# Patient Record
Sex: MALE | Race: WHITE | Employment: OTHER | URBAN - METROPOLITAN AREA
[De-identification: names, ages, dates, MRNs, and addresses within clinical notes are randomized per-mention and may not be internally consistent; named-entity substitution may affect disease eponyms.]

---

## 2017-04-05 ENCOUNTER — HOSPITAL ENCOUNTER (OUTPATIENT)
Dept: OTHER | Age: 77
Discharge: HOME | End: 2017-04-30
Attending: PSYCHIATRY & NEUROLOGY | Admitting: PSYCHIATRY & NEUROLOGY

## 2017-04-05 ASSESSMENT — PAIN SCALES - GENERAL: PAINLEVEL_OUTOF10: 3

## 2017-04-05 ASSESSMENT — PAIN DESCRIPTION - LOCATION: LOCATION: COCCYX

## 2017-04-17 ENCOUNTER — HOSPITAL ENCOUNTER (OUTPATIENT)
Dept: PHYSICAL THERAPY | Age: 77
Discharge: HOME | End: 2017-04-18
Admitting: PSYCHIATRY & NEUROLOGY

## 2017-04-19 ENCOUNTER — HOSPITAL ENCOUNTER (OUTPATIENT)
Dept: PHYSICAL THERAPY | Age: 77
Discharge: HOME | End: 2017-04-20
Admitting: PSYCHIATRY & NEUROLOGY

## 2017-04-24 ENCOUNTER — HOSPITAL ENCOUNTER (OUTPATIENT)
Dept: PHYSICAL THERAPY | Age: 77
Discharge: HOME | End: 2017-04-25
Admitting: PSYCHIATRY & NEUROLOGY

## 2017-04-26 ENCOUNTER — HOSPITAL ENCOUNTER (OUTPATIENT)
Dept: PHYSICAL THERAPY | Age: 77
Discharge: HOME | End: 2017-04-27
Admitting: PSYCHIATRY & NEUROLOGY

## 2017-05-03 ENCOUNTER — HOSPITAL ENCOUNTER (OUTPATIENT)
Dept: PHYSICAL THERAPY | Age: 77
Discharge: HOME | End: 2017-05-04
Admitting: PSYCHIATRY & NEUROLOGY

## 2017-05-10 ENCOUNTER — HOSPITAL ENCOUNTER (OUTPATIENT)
Dept: PHYSICAL THERAPY | Age: 77
Discharge: HOME | End: 2017-05-11
Admitting: PSYCHIATRY & NEUROLOGY

## 2017-05-12 ENCOUNTER — HOSPITAL ENCOUNTER (OUTPATIENT)
Dept: PHYSICAL THERAPY | Age: 77
Discharge: HOME | End: 2017-05-13
Admitting: PSYCHIATRY & NEUROLOGY

## 2017-05-16 ENCOUNTER — HOSPITAL ENCOUNTER (OUTPATIENT)
Dept: PHYSICAL THERAPY | Age: 77
Discharge: HOME | End: 2017-05-17
Admitting: PSYCHIATRY & NEUROLOGY

## 2018-10-09 ENCOUNTER — APPOINTMENT (OUTPATIENT)
Dept: GENERAL RADIOLOGY | Age: 78
End: 2018-10-09
Payer: MEDICARE

## 2018-10-09 ENCOUNTER — APPOINTMENT (OUTPATIENT)
Dept: CT IMAGING | Age: 78
End: 2018-10-09
Payer: MEDICARE

## 2018-10-09 ENCOUNTER — HOSPITAL ENCOUNTER (EMERGENCY)
Age: 78
Discharge: HOME | End: 2018-10-09
Attending: EMERGENCY MEDICINE
Payer: MEDICARE

## 2018-10-09 VITALS
HEART RATE: 54 BPM | OXYGEN SATURATION: 100 % | RESPIRATION RATE: 17 BRPM | SYSTOLIC BLOOD PRESSURE: 157 MMHG | DIASTOLIC BLOOD PRESSURE: 90 MMHG | TEMPERATURE: 97.6 F

## 2018-10-09 DIAGNOSIS — R41.82 ALTERED MENTAL STATUS, UNSPECIFIED ALTERED MENTAL STATUS TYPE: Primary | ICD-10-CM

## 2018-10-09 DIAGNOSIS — G31.83 LEWY BODY PARKINSON'S DISEASE (HCC): ICD-10-CM

## 2018-10-09 DIAGNOSIS — F02.80 LEWY BODY PARKINSON'S DISEASE (HCC): ICD-10-CM

## 2018-10-09 LAB
A/G RATIO: 1.6 (ref 1.1–2.2)
ALBUMIN SERPL-MCNC: 4.4 G/DL (ref 3.4–5)
ALP BLD-CCNC: 64 U/L (ref 40–129)
ALT SERPL-CCNC: <5 U/L (ref 10–40)
ANION GAP SERPL CALCULATED.3IONS-SCNC: 13 MMOL/L (ref 3–16)
AST SERPL-CCNC: 12 U/L (ref 15–37)
BASOPHILS ABSOLUTE: 0 K/UL (ref 0–0.2)
BASOPHILS RELATIVE PERCENT: 0.7 %
BILIRUB SERPL-MCNC: 0.8 MG/DL (ref 0–1)
BILIRUBIN URINE: NEGATIVE
BLOOD, URINE: ABNORMAL
BUN BLDV-MCNC: 27 MG/DL (ref 7–20)
CALCIUM SERPL-MCNC: 9.7 MG/DL (ref 8.3–10.6)
CHLORIDE BLD-SCNC: 103 MMOL/L (ref 99–110)
CLARITY: CLEAR
CO2: 26 MMOL/L (ref 21–32)
COLOR: YELLOW
COMMENT UA: ABNORMAL
CREAT SERPL-MCNC: 1 MG/DL (ref 0.8–1.3)
EOSINOPHILS ABSOLUTE: 0.1 K/UL (ref 0–0.6)
EOSINOPHILS RELATIVE PERCENT: 1.6 %
EPITHELIAL CELLS, UA: ABNORMAL /HPF
GFR AFRICAN AMERICAN: >60
GFR NON-AFRICAN AMERICAN: >60
GLOBULIN: 2.7 G/DL
GLUCOSE BLD-MCNC: 104 MG/DL (ref 70–99)
GLUCOSE URINE: NEGATIVE MG/DL
HCT VFR BLD CALC: 37.6 % (ref 40.5–52.5)
HEMOGLOBIN: 12.6 G/DL (ref 13.5–17.5)
KETONES, URINE: ABNORMAL MG/DL
LEUKOCYTE ESTERASE, URINE: NEGATIVE
LYMPHOCYTES ABSOLUTE: 1 K/UL (ref 1–5.1)
LYMPHOCYTES RELATIVE PERCENT: 15.5 %
MCH RBC QN AUTO: 31.8 PG (ref 26–34)
MCHC RBC AUTO-ENTMCNC: 33.5 G/DL (ref 31–36)
MCV RBC AUTO: 94.9 FL (ref 80–100)
MICROSCOPIC EXAMINATION: YES
MONOCYTES ABSOLUTE: 0.7 K/UL (ref 0–1.3)
MONOCYTES RELATIVE PERCENT: 10.6 %
MUCUS: ABNORMAL /LPF
NEUTROPHILS ABSOLUTE: 4.5 K/UL (ref 1.7–7.7)
NEUTROPHILS RELATIVE PERCENT: 71.6 %
NITRITE, URINE: NEGATIVE
PDW BLD-RTO: 14.2 % (ref 12.4–15.4)
PH UA: 5.5
PLATELET # BLD: 143 K/UL (ref 135–450)
PMV BLD AUTO: 9.9 FL (ref 5–10.5)
POTASSIUM REFLEX MAGNESIUM: 4.2 MMOL/L (ref 3.5–5.1)
PROTEIN UA: 30 MG/DL
RBC # BLD: 3.96 M/UL (ref 4.2–5.9)
RBC UA: ABNORMAL /HPF (ref 0–2)
SODIUM BLD-SCNC: 142 MMOL/L (ref 136–145)
SPECIFIC GRAVITY UA: >=1.03
TOTAL PROTEIN: 7.1 G/DL (ref 6.4–8.2)
TRICHOMONAS: ABNORMAL /HPF
TROPONIN: <0.01 NG/ML
URINE REFLEX TO CULTURE: ABNORMAL
URINE TYPE: ABNORMAL
UROBILINOGEN, URINE: 1 E.U./DL
WBC # BLD: 6.4 K/UL (ref 4–11)
WBC UA: ABNORMAL /HPF (ref 0–5)

## 2018-10-09 PROCEDURE — 80053 COMPREHEN METABOLIC PANEL: CPT

## 2018-10-09 PROCEDURE — 85025 COMPLETE CBC W/AUTO DIFF WBC: CPT

## 2018-10-09 PROCEDURE — 99285 EMERGENCY DEPT VISIT HI MDM: CPT

## 2018-10-09 PROCEDURE — 71045 X-RAY EXAM CHEST 1 VIEW: CPT

## 2018-10-09 PROCEDURE — 70450 CT HEAD/BRAIN W/O DYE: CPT

## 2018-10-09 PROCEDURE — 84484 ASSAY OF TROPONIN QUANT: CPT

## 2018-10-09 PROCEDURE — 93005 ELECTROCARDIOGRAM TRACING: CPT | Performed by: EMERGENCY MEDICINE

## 2018-10-09 PROCEDURE — 2580000003 HC RX 258: Performed by: EMERGENCY MEDICINE

## 2018-10-09 PROCEDURE — 81001 URINALYSIS AUTO W/SCOPE: CPT

## 2018-10-09 RX ORDER — 0.9 % SODIUM CHLORIDE 0.9 %
500 INTRAVENOUS SOLUTION INTRAVENOUS ONCE
Status: COMPLETED | OUTPATIENT
Start: 2018-10-09 | End: 2018-10-09

## 2018-10-09 RX ADMIN — SODIUM CHLORIDE 500 ML: 9 INJECTION, SOLUTION INTRAVENOUS at 12:34

## 2018-10-09 ASSESSMENT — ENCOUNTER SYMPTOMS
EYE PAIN: 0
ABDOMINAL PAIN: 0
COUGH: 0
VOMITING: 0
BACK PAIN: 0
EYE REDNESS: 0
DIARRHEA: 0
EYE DISCHARGE: 0
WHEEZING: 0
SORE THROAT: 0
NAUSEA: 0
SHORTNESS OF BREATH: 0
RHINORRHEA: 0

## 2018-10-09 NOTE — ED PROVIDER NOTES
[facility]  eMERGENCY dEPARTMENT eNCOUnter        Pt Name: Erick Chen  MRN: [id number]  [place] 1940  Date of evaluation: 10/9/2018  Provider: Catina Decker MD  PCP: Kris Waters       Chief Complaint   Patient presents with    Altered Mental Status     x 2 weeks, pt has dementia       HISTORY OF PRESENT ILLNESS   (Location/Symptom, Timing/Onset, Context/Setting, Quality, Duration, Modifying Factors, Severity)  Note limiting factors. Ercik Chen is a [de-identified] y.o. male       Location/Symptom: Change in mental status  Timing/Onset: Past 3-4 days  Context/Settin-year-old gentleman still lives at home with his wife. He does have a history of Parkinson's disease as well as Lewy body dementia. Family members state that the last 3 or 4 days he's been not himself not drinking much and has a decreasing urine output. Quality: No appreciable fever or nausea or vomiting or trouble breathing. Duration: Past 4 days  Modifying Factors: None  Severity: 0    Nursing Notes were all reviewed and agreed with or any disagreements were addressed  in the HPI. REVIEW OF SYSTEMS    (2-9 systems for level 4, 10 or more for level 5)     Review of Systems   Constitutional: Positive for activity change and appetite change. Negative for chills, fatigue and fever. HENT: Negative for ear pain, rhinorrhea and sore throat. Eyes: Negative for pain, discharge, redness and visual disturbance. Respiratory: Negative for cough, shortness of breath and wheezing. Cardiovascular: Negative for chest pain, palpitations and leg swelling. Gastrointestinal: Negative for abdominal pain, diarrhea, nausea and vomiting. Genitourinary: Negative for difficulty urinating and dysuria. Urinary incontinence   Musculoskeletal: Negative for arthralgias, back pain and myalgias. Skin: Negative for rash.    Allergic/Immunologic: Negative for environmental allergies. Neurological: Negative for dizziness, seizures, syncope and headaches. Parkinson's and dementia   Hematological: Negative for adenopathy. Psychiatric/Behavioral: Positive for confusion. Negative for suicidal ideas. The patient is not nervous/anxious. PAST MEDICAL HISTORY     Past Medical History:   Diagnosis Date    Dementia     Diverticulitis     Parkinson disease ([address]          SURGICAL HISTORY       Past Surgical History:   Procedure Laterality Date    APPENDECTOMY      CHOLECYSTECTOMY      EYE SURGERY      bilateral cataract surgery     HERNIA REPAIR           CURRENT MEDICATIONS       Previous Medications    No medications on file       ALLERGIES     Patient has no known allergies. FAMILY HISTORY     History reviewed. No pertinent family history. SOCIAL HISTORY       Social History     Social History    Marital status:      Spouse name: N/A    Number of children: N/A    Years of education: N/A     Social History Main Topics    Smoking status: Former Smoker    Smokeless tobacco: Never Used    Alcohol use No    Drug use: No    Sexual activity: Not Asked     Other Topics Concern    None     Social History Narrative    None       SCREENINGS    [place] Coma Scale  Eye Opening: Spontaneous  Best Verbal Response: Oriented  Best Motor Response: Obeys commands  [place] Coma Scale Score: 15        PHYSICAL EXAM    (up to 7 for level 4, 8 or more for level 5)     ED Triage Vitals [10/09/18 1114]   BP Temp Temp Source Pulse Resp SpO2 Height Weight   (!) 114/58 97.6 °F (36.4 °C) Temporal 64 14 97 % -- --      temporal temperature is 97.6 °F (36.4 °C). His blood pressure is 114/58 (abnormal) and his pulse is 64. His respiration is 14 and oxygen saturation is 97%. Physical Exam   Constitutional: He appears well-developed and well-nourished. He appears cachectic. HENT:   Head: Normocephalic and atraumatic.    Right Ear: External ear normal.   Left Ear: Metabolic Panel w/ Reflex to MG   Result Value Ref Range    Sodium 142 136 - 145 mmol/L    Potassium reflex Magnesium 4.2 3.5 - 5.1 mmol/L    Chloride 103 99 - 110 mmol/L    CO2 26 21 - 32 mmol/L    Anion Gap 13 3 - 16    Glucose 104 (H) 70 - 99 mg/dL    BUN 27 (H) 7 - 20 mg/dL    CREATININE 1.0 0.8 - 1.3 mg/dL    GFR Non-African American >60 >60    GFR African American >60 >60    Calcium 9.7 8.3 - 10.6 mg/dL    Total Protein 7.1 6.4 - 8.2 g/dL    Alb 4.4 3.4 - 5.0 g/dL    Albumin/Globulin Ratio 1.6 1.1 - 2.2    Total Bilirubin 0.8 0.0 - 1.0 mg/dL    Alkaline Phosphatase 64 40 - 129 U/L    ALT <5 (L) 10 - 40 U/L    AST 12 (L) 15 - 37 U/L    Globulin 2.7 g/dL   Troponin   Result Value Ref Range    Troponin <0.01 <0.01 ng/mL   Urine Reflex to Culture   Result Value Ref Range    Color, UA Yellow Straw/Yellow    Clarity, UA Clear Clear    Glucose, Ur Negative Negative mg/dL    Bilirubin Urine Negative Negative    Ketones, Urine TRACE (A) Negative mg/dL    Specific Gravity, UA >=1.030 1.005 - 1.030    Blood, Urine TRACE-INTACT (A) Negative    pH, UA 5.5 5.0 - 8.0    Protein, UA 30 (A) Negative mg/dL    Urobilinogen, Urine 1.0 <2.0 E.U./dL    Nitrite, Urine Negative Negative    Leukocyte Esterase, Urine Negative Negative    Microscopic Examination YES     Urine Reflex to Culture Not Indicated     Urine Type Not Specified    Microscopic Urinalysis   Result Value Ref Range    Mucus, UA Rare (A) /LPF    WBC, UA None seen 0 - 5 /HPF    RBC, UA 0-2 0 - 2 /HPF    Epi Cells 3-5 /HPF    Trichomonas, UA None Seen /HPF    Urinalysis Comments see below        All other labs were within normal range or not returned as of this dictation. EKG: All EKG's are interpreted by the Emergency Department Physician who either signs or Co-signs this chart in the absence of a cardiologist.    EKG visualized interpreted by myself. The rhythm is sinus. There is left ventricular hypertrophy and repolarization changes.   Evidence of previous

## 2018-10-10 LAB
EKG ATRIAL RATE: 71 BPM
EKG DIAGNOSIS: NORMAL
EKG P AXIS: 97 DEGREES
EKG P-R INTERVAL: 182 MS
EKG Q-T INTERVAL: 420 MS
EKG QRS DURATION: 120 MS
EKG QTC CALCULATION (BAZETT): 456 MS
EKG R AXIS: -51 DEGREES
EKG T AXIS: 27 DEGREES
EKG VENTRICULAR RATE: 71 BPM

## 2018-10-10 PROCEDURE — 93010 ELECTROCARDIOGRAM REPORT: CPT | Performed by: INTERNAL MEDICINE

## 2019-06-09 ENCOUNTER — APPOINTMENT (OUTPATIENT)
Dept: GENERAL RADIOLOGY | Age: 79
End: 2019-06-09
Payer: COMMERCIAL

## 2019-06-09 ENCOUNTER — APPOINTMENT (OUTPATIENT)
Dept: CT IMAGING | Age: 79
End: 2019-06-09
Payer: COMMERCIAL

## 2019-06-09 ENCOUNTER — HOSPITAL ENCOUNTER (EMERGENCY)
Age: 79
Discharge: HOME | End: 2019-06-09
Attending: EMERGENCY MEDICINE
Payer: COMMERCIAL

## 2019-06-09 VITALS
RESPIRATION RATE: 21 BRPM | SYSTOLIC BLOOD PRESSURE: 200 MMHG | OXYGEN SATURATION: 99 % | HEART RATE: 99 BPM | DIASTOLIC BLOOD PRESSURE: 95 MMHG | TEMPERATURE: 99 F

## 2019-06-09 DIAGNOSIS — S06.341A: Primary | ICD-10-CM

## 2019-06-09 DIAGNOSIS — S00.83XA FACIAL CONTUSION, INITIAL ENCOUNTER: ICD-10-CM

## 2019-06-09 LAB
A/G RATIO: 1.6 (ref 1.1–2.2)
ALBUMIN SERPL-MCNC: 4.5 G/DL (ref 3.4–5)
ALP BLD-CCNC: 79 U/L (ref 40–129)
ALT SERPL-CCNC: <5 U/L (ref 10–40)
ANION GAP SERPL CALCULATED.3IONS-SCNC: 8 MMOL/L (ref 3–16)
AST SERPL-CCNC: 15 U/L (ref 15–37)
BASOPHILS ABSOLUTE: 0.1 K/UL (ref 0–0.2)
BASOPHILS RELATIVE PERCENT: 1.2 %
BILIRUB SERPL-MCNC: 0.7 MG/DL (ref 0–1)
BUN BLDV-MCNC: 24 MG/DL (ref 7–20)
CALCIUM SERPL-MCNC: 9.4 MG/DL (ref 8.3–10.6)
CHLORIDE BLD-SCNC: 107 MMOL/L (ref 99–110)
CO2: 27 MMOL/L (ref 21–32)
CREAT SERPL-MCNC: 0.8 MG/DL (ref 0.8–1.3)
EOSINOPHILS ABSOLUTE: 0.1 K/UL (ref 0–0.6)
EOSINOPHILS RELATIVE PERCENT: 2.9 %
GFR AFRICAN AMERICAN: >60
GFR NON-AFRICAN AMERICAN: >60
GLOBULIN: 2.8 G/DL
GLUCOSE BLD-MCNC: 134 MG/DL (ref 70–99)
GLUCOSE BLD-MCNC: 154 MG/DL (ref 70–99)
HCT VFR BLD CALC: 37.4 % (ref 40.5–52.5)
HEMOGLOBIN: 12.3 G/DL (ref 13.5–17.5)
INR BLD: 1.07 (ref 0.86–1.14)
LYMPHOCYTES ABSOLUTE: 1 K/UL (ref 1–5.1)
LYMPHOCYTES RELATIVE PERCENT: 21.8 %
MCH RBC QN AUTO: 30.8 PG (ref 26–34)
MCHC RBC AUTO-ENTMCNC: 32.9 G/DL (ref 31–36)
MCV RBC AUTO: 93.5 FL (ref 80–100)
MONOCYTES ABSOLUTE: 0.4 K/UL (ref 0–1.3)
MONOCYTES RELATIVE PERCENT: 8.5 %
NEUTROPHILS ABSOLUTE: 3.1 K/UL (ref 1.7–7.7)
NEUTROPHILS RELATIVE PERCENT: 65.6 %
PDW BLD-RTO: 14 % (ref 12.4–15.4)
PERFORMED ON: ABNORMAL
PLATELET # BLD: 186 K/UL (ref 135–450)
PMV BLD AUTO: 9.7 FL (ref 5–10.5)
POTASSIUM SERPL-SCNC: 4.3 MMOL/L (ref 3.5–5.1)
PROTHROMBIN TIME: 12.2 SEC (ref 9.8–13)
RBC # BLD: 4 M/UL (ref 4.2–5.9)
SODIUM BLD-SCNC: 142 MMOL/L (ref 136–145)
TOTAL PROTEIN: 7.3 G/DL (ref 6.4–8.2)
WBC # BLD: 4.7 K/UL (ref 4–11)

## 2019-06-09 PROCEDURE — 36415 COLL VENOUS BLD VENIPUNCTURE: CPT

## 2019-06-09 PROCEDURE — 70450 CT HEAD/BRAIN W/O DYE: CPT

## 2019-06-09 PROCEDURE — 70496 CT ANGIOGRAPHY HEAD: CPT

## 2019-06-09 PROCEDURE — 71045 X-RAY EXAM CHEST 1 VIEW: CPT

## 2019-06-09 PROCEDURE — 70498 CT ANGIOGRAPHY NECK: CPT

## 2019-06-09 PROCEDURE — 6360000002 HC RX W HCPCS: Performed by: EMERGENCY MEDICINE

## 2019-06-09 PROCEDURE — 85610 PROTHROMBIN TIME: CPT

## 2019-06-09 PROCEDURE — 80053 COMPREHEN METABOLIC PANEL: CPT

## 2019-06-09 PROCEDURE — 85025 COMPLETE CBC W/AUTO DIFF WBC: CPT

## 2019-06-09 PROCEDURE — 6360000004 HC RX CONTRAST MEDICATION: Performed by: EMERGENCY MEDICINE

## 2019-06-09 PROCEDURE — 6370000000 HC RX 637 (ALT 250 FOR IP): Performed by: EMERGENCY MEDICINE

## 2019-06-09 PROCEDURE — 96376 TX/PRO/DX INJ SAME DRUG ADON: CPT

## 2019-06-09 PROCEDURE — 96374 THER/PROPH/DIAG INJ IV PUSH: CPT

## 2019-06-09 PROCEDURE — 99285 EMERGENCY DEPT VISIT HI MDM: CPT

## 2019-06-09 RX ORDER — LORAZEPAM 2 MG/ML
0.5 INJECTION INTRAMUSCULAR ONCE
Status: COMPLETED | OUTPATIENT
Start: 2019-06-09 | End: 2019-06-09

## 2019-06-09 RX ORDER — LORAZEPAM 2 MG/ML
1 INJECTION INTRAMUSCULAR ONCE
Status: COMPLETED | OUTPATIENT
Start: 2019-06-09 | End: 2019-06-09

## 2019-06-09 RX ORDER — LORAZEPAM 1 MG/1
1 TABLET ORAL EVERY 8 HOURS PRN
Qty: 9 TABLET | Refills: 0 | Status: SHIPPED | OUTPATIENT
Start: 2019-06-09 | End: 2019-07-09

## 2019-06-09 RX ORDER — HYDROCODONE BITARTRATE AND ACETAMINOPHEN 5; 325 MG/1; MG/1
1 TABLET ORAL EVERY 6 HOURS PRN
Qty: 12 TABLET | Refills: 0 | Status: SHIPPED | OUTPATIENT
Start: 2019-06-09 | End: 2019-06-12

## 2019-06-09 RX ORDER — LORAZEPAM 0.5 MG/1
0.5 TABLET ORAL ONCE
Status: DISCONTINUED | OUTPATIENT
Start: 2019-06-09 | End: 2019-06-09 | Stop reason: HOSPADM

## 2019-06-09 RX ADMIN — LORAZEPAM 0.5 MG: 2 INJECTION INTRAMUSCULAR; INTRAVENOUS at 14:51

## 2019-06-09 RX ADMIN — LORAZEPAM 1 MG: 2 INJECTION INTRAMUSCULAR; INTRAVENOUS at 15:15

## 2019-06-09 RX ADMIN — IOPAMIDOL 75 ML: 755 INJECTION, SOLUTION INTRAVENOUS at 10:43

## 2019-06-09 RX ADMIN — LORAZEPAM 1 MG: 2 INJECTION INTRAMUSCULAR; INTRAVENOUS at 16:47

## 2019-06-09 ASSESSMENT — PAIN SCALES - PAIN ASSESSMENT IN ADVANCED DEMENTIA (PAINAD)
NEGVOCALIZATION: 0
NEGVOCALIZATION: 0
BODYLANGUAGE: 1
TOTALSCORE: 2
CONSOLABILITY: 0
FACIALEXPRESSION: 0
BREATHING: 0
TOTALSCORE: 0
BODYLANGUAGE: 0
FACIALEXPRESSION: 0
CONSOLABILITY: 1
BREATHING: 0

## 2019-06-09 NOTE — ED NOTES
Discharge instructions reviewed with wife. Verbalizes understanding.       Ky Arenas RN  06/09/19 1764

## 2019-06-09 NOTE — ED NOTES
Called stroke team [phone] at  Dr. Chirag Mcmillan called back [facility]  06/09/19 4023

## 2019-06-09 NOTE — ED NOTES
Called stroke team [phone] Dr. Jaleesa Carr called back [address] [place] [address] Rd  06/09/19 0802

## 2019-06-09 NOTE — ED PROVIDER NOTES
11 [facility]  eMERGENCY dEPARTMENT eNCOUnter      Pt Name: Emigdio Carreno  MRN: [id number]  [place] 1940  Date of evaluation: 6/9/2019  Provider: Major Eli MD    CHIEF COMPLAINT       Chief Complaint   Patient presents with    Fall    Facial Droop         CRITICAL CARE TIME   Total Critical Care time was 30 minutes, excluding separately reportable procedures. There was a high probability of clinically significant/life threatening deterioration in the patient's condition which required my urgent intervention. Critical care time includes my initial evaluation, ongoing reassessment, review of laboratory, CT scan, chest x-ray, consultation with the stroke team physician on 2 separate occasions, consultation for placement in hospice. HISTORY OF PRESENT ILLNESS  (Location/Symptom, Timing/Onset, Context/Setting, Quality, Duration, Modifying Factors, Severity.)   Emigdio Carreno is a 78 y.o. male who presents to the emergency department the [facility] after fall at home. He was found on the floor by his wife. His last known well time is not known, his wife is not here. He has advanced Parkinson's disease. He was noted to have a facial droop when the squad arrived. He had right facial trauma with bleeding from his nose. The patient had some slow active bleeding at the time according to EMS. His initial blood sugar in the field was 140. His speech was difficult to understand. His daughter has arrived and says that his speech is not normal, but she states it is better than what we are seeing here. The facial droop is normal. She last saw him on Friday so she does not know his last known well time. We are waiting for the wife to arrive. Nursing Notes were reviewed and I agree. REVIEW OF SYSTEMS    (2-9 systems for level 4, 10 or more for level 5)     Unable to obtain due to his expressive aphasia.    Except as noted above the remainder of the review of Gen.: An alert elderly male in no obvious distress. Head: Right forehead bruising with minimal swelling. Eyes: Pupils equal round reactive. Poor cooperation for extraocular movements. Questionable disconjugate gaze intermittently. ENT: Bruising of the right maxilla. Small amount of active bleeding from the right nares. TMs normal. Oropharynx shows some bruising of his lips. No other dental trauma. Neck: Supple, nontender. Heart: Regular rate and rhythm. No murmurs gallops noted. Lungs: Breath sounds equal bilaterally and clear. Abdomen: Soft, nondistended, nontender. Muscle skeletal: No obvious extremity trauma. He moves the upper and lower extremities without apparent pain. Skin: Warm and dry, fair turgor. Neuro: Awake, alert, symmetrical reactive pupils. Questionable intermittent disconjugate gaze with deviation of the right eye laterally. Right facial droop. Speech is slurred and difficult to understand. Difficult motor exam, he cooperates with no obvious drift in the upper extremities. He has difficulty lifting both legs off the bed but can move both feet and toes symmetrically. He cannot cooperate for urination. DIFFERENTIAL DIAGNOSIS   Differential includes but is not limited to closed head injury, traumatic intracerebral hemorrhage, traumatic subdural, embolic stroke, thrombotic stroke, hypoglycemia, facial contusion, facial bone fractures.       DIAGNOSTIC RESULTS     EKG: All EKG's are interpreted by Alis Pruett MD in the absence of a cardiologist.      RADIOLOGY:   Non-plain film images such as CT, Ultrasound and MRI are read by the radiologist. Plain radiographic images are visualized and preliminarily interpreted Alis Pruett MD with the below findings:    Interpretation per the Radiologist below, if available at the time of this note:    XR CHEST PORTABLE   Final Result   Minimal bibasilar opacity, favoring atelectasis in the absence of clinical   signs or symptoms of pneumonitis. CT Head WO Contrast   Final Result   1. There are small intraparenchymal hemorrhages noted in the right temporal   lobe and right frontal lobe. 2. Blood is noted in the right maxillary and sphenoid sinuses. Underlying   acute fracture cannot be excluded. Dedicated facial bone CT is recommended   for further evaluation. 3. Cerebral parenchymal volume loss with severe chronic microvascular white   matter ischemic disease, stable. 4. Chronic infarcts are noted in the basal ganglia and centrum semiovale. 5. Right-sided scalp soft tissue swelling and right periorbital soft tissue   swelling. 6. Results were discussed with Dr. Donna Carroll at [phone] on 06/09/2019. CTA HEAD W CONTRAST   Final Result   1. No evidence of a major branch vessel occlusion or aneurysm. 2. There is a moderate-severe stenosis at the left vertebral artery origin   with a greater than 50% stenosis. 3. There is a 25% stenosis at the right vertebral artery origin and a 50%   stenosis in the V4 segment of the right vertebral artery. 4. There is a 25% stenosis in the proximal right internal carotid artery. 5. There is a severe stenosis along the left M2 branch, anterior division. There are areas of mild narrowing along the right M2 branches of the middle   cerebral artery. CTA NECK W CONTRAST   Final Result   1. No evidence of a major branch vessel occlusion or aneurysm. 2. There is a moderate-severe stenosis at the left vertebral artery origin   with a greater than 50% stenosis. 3. There is a 25% stenosis at the right vertebral artery origin and a 50%   stenosis in the V4 segment of the right vertebral artery. 4. There is a 25% stenosis in the proximal right internal carotid artery. 5. There is a severe stenosis along the left M2 branch, anterior division. There are areas of mild narrowing along the right M2 branches of the middle   cerebral artery.                ED BEDSIDE ULTRASOUND:   Performed by ED Physician - none    LABS:  Labs Reviewed   CBC WITH AUTO DIFFERENTIAL - Abnormal; Notable for the following components:       Result Value    RBC 4.00 (*)     Hemoglobin 12.3 (*)     Hematocrit 37.4 (*)     All other components within normal limits    Narrative:     Performed at:  [facility]  [address] [facility] [address] [address]   Phone [phone]   COMPREHENSIVE METABOLIC PANEL - Abnormal; Notable for the following components:    Glucose 154 (*)     BUN 24 (*)     ALT <5 (*)     All other components within normal limits    Narrative:     Performed at:  [facility]  [address] [facility] [address] [address]   Phone [phone]   POCT GLUCOSE - Abnormal; Notable for the following components:    POC Glucose 134 (*)     All other components within normal limits    Narrative:     Performed at:  [address] [employer] [address]   Phone [phone]   PROTIME-INR    Narrative:     Performed at:  [facility]  [address] [place], [place] [address] [address]   Phone [phone]       All other labs were within normal range or not returned as of this dictation. EMERGENCY DEPARTMENT COURSE and DIFFERENTIAL DIAGNOSIS/MDM:   Vitals:    Vitals:    06/09/19 1345 06/09/19 1430 06/09/19 1509 06/09/19 1645   BP: (!) 160/120 (!) 178/100  (!) 162/112   Pulse: 102 103 101 101   Resp: 21 26 21 23   Temp:    99 °F (37.2 °C)   TempSrc:    Temporal   SpO2: 96% 99% 99% 99%       1035: I spoke with the stroke team physician, Dr. Ruthie Lipscomb. At this point in time we do not have a last known well time. We're waiting for his wife to arrive. He has baselines Parkinson's disease with some degree of dementia. His speech is obviously worse than usual. He has a facial droop. He has a questionable disconjugate gaze.  His motor exam is difficult, but he seems to be moving his extremities symmetrically. Code stroke has been called. The patient has been transported to CT. When I get additional information I will speak again with the stroke team physician. 1110: After the patient had been scanned, a nurse from hospice arrive. Apparently the patient is in home hospice. We were not aware of this prior to calling the code stroke and doing the CT scan. His noncontrast CT per radiology shows parenchymal frontal lobe and temporal lobe hemorrhage on the right. The patient's mental status is unchanged. His neurologic exam was unchanged. I updated the daughter and the hospice nurse on the patient's findings. His wife is still not here but is on her way. I'm going to up date her. Tentatively the plan would be to get him in a inpatient hospice bed where he can receive 24-hour care and kept comfortable. 1155: The patient's wife is here. I discussed the patient's injuries from the fall including but not limited to his frontal lobe and right temporal lobe intracerebral hemorrhage, his facial injury which is likely related to her fracture with blood in his maxillary sinus. He is in hospice. The hospice nurse is here. We've discussed appropriate care upon discharge. They will arrange for a 24-hour home health aide to stay today and tomorrow until they can arrange an inpatient hospice bed. They will work with the patient's wife tomorrow to arrange the inpatient setting. I think he will need additional care until we see how he does with this new injury. They do want to continue hospice. If he improves or is able to manage at home they will transitioning back to home hospice. I did write for short-term pain control. The patient remains unchanged, he remains awake and alert. He verbalizes minimally. His facial droop is unchanged. He's developed no new deficits. He's had no vomiting. Both the patient's wife, his daughters, and hospice are agreeable with our treatment plan.  Were going to arrange for ambulance transport back to his home. Hospice is going to arrange for the home health aide. 1650:  Patient was having a lot of agitation prior to discharge so I did write a prescription for some Ativan by mouth to be used by the patient. I gave a 3 day supply, additional medication can be ordered by his hospice physician. Controlled Substance Monitoring:    Acute and Chronic Pain Monitoring:   RX Monitoring 6/9/2019   Periodic Controlled Substance Monitoring Possible medication side effects, risk of tolerance/dependence & alternative treatments discussed. CONSULTS:  IP CONSULT TO STROKE TEAM    PROCEDURES:  None    FINAL IMPRESSION      1. Traumatic right-sided intracerebral hemorrhage with loss of consciousness of 30 minutes or less, initial encounter ([facility] [address]    2. Facial contusion, initial encounter          DISPOSITION/PLAN   DISPOSITION        PATIENT REFERRED TO:  Luda Ocampo  [address] [place] [address] [address]  [place] [address]  [phone]    In 3 days      Hospice    In 1 day        DISCHARGE MEDICATIONS:  New Prescriptions    HYDROCODONE-ACETAMINOPHEN (NORCO) 5-325 MG PER TABLET    Take 1 tablet by mouth every 6 hours as needed for Pain for up to 3 days. Intended supply: 3 days. Take lowest dose possible to manage pain    LORAZEPAM (ATIVAN) 1 MG TABLET    Take 1 tablet by mouth every 8 hours as needed (agitation) for up to 30 days.        (Please note that portions of this note were completed with a voice recognition program.  Efforts were made to edit the dictations but occasionally words are mis-transcribed.)    Sharon Morse MD  Attending Emergency Physician        Darrick Stover MD  06/09/19 479 Jose León MD  06/09/19 [phone]

## 2019-06-09 NOTE — ED NOTES
Transport here for pt at this time. Pt gowned and ready for transport. Pt started to shake on transport from ED cot to transport cot. Pt family concerned. EDMD aware of shaking - no further treatment indicated and best plan is to go home to hospice. Pt family made aware and agrees with plan.       Yogesh Olmedo RN  06/09/19 1491

## 2019-06-09 NOTE — ED NOTES
[facility]         Edmundo Palacios RN  06/09/19 6349

## 2019-06-09 NOTE — ED NOTES
Covering for lunch duty. Received call from Patient's hospice nurse, [phone] requesting a prescription be written for liquid ativan incase patient would have a seizure at home. ED MD aware and stated that the prescription would need to come from the medical director at hospice.       Aysha Ram RN  06/09/19 1241

## 2019-06-09 NOTE — ED TRIAGE NOTES
Pt presents to ED with c/o of fall and a facial droop with an unknown mental baseline and unknown last known well. EMS reports on arrival to house at [phone] there was fresh blood on floor from face and a right sided facial droop. Sister is only one at bedside at this time and she said she last saw him well was Friday. Per sister, pt baseline status he intermittently does not recognize people and speaks in short and sometimes incoherent sentences due to his Parkinson's. Called code stroke per ED MD and transferred to [facility] with RN. +hypertension.

## 2019-06-09 NOTE — ED NOTES
Pt is now more alert and attempting to speak with family members.       Maximilian Lozano RN  06/09/19 4368

## 2019-06-09 NOTE — ED NOTES
Bed: [facility]  Expected date: 6/9/19  Expected time: 10:17 AM  Means of arrival: [facility]  Comments:  Randy Renteria RN  06/09/19 1022

## 2019-06-09 NOTE — ED NOTES
Pt is now more agitated per family as evidence by trying to get out of bed, and trying to pull off gown. EDMD made aware. See new orders.       Naa Machuca RN  06/09/19 4331

## 2019-06-09 NOTE — ED NOTES
Spoke to hospice nurse regarding [facility]. Per EDMD, told hospice RN to contact medical director for [facility]. She verbalizes understanding.       Janeen Christianson, RN  06/09/19 8564